# Patient Record
Sex: FEMALE | Race: WHITE | NOT HISPANIC OR LATINO | URBAN - NONMETROPOLITAN AREA
[De-identification: names, ages, dates, MRNs, and addresses within clinical notes are randomized per-mention and may not be internally consistent; named-entity substitution may affect disease eponyms.]

---

## 2020-11-13 ENCOUNTER — LAB OUTSIDE AN ENCOUNTER (OUTPATIENT)
Dept: URBAN - NONMETROPOLITAN AREA CLINIC 13 | Facility: CLINIC | Age: 20
End: 2020-11-13

## 2020-11-13 ENCOUNTER — TELEPHONE ENCOUNTER (OUTPATIENT)
Dept: URBAN - NONMETROPOLITAN AREA CLINIC 2 | Facility: CLINIC | Age: 20
End: 2020-11-13

## 2020-11-13 ENCOUNTER — OFFICE VISIT (OUTPATIENT)
Dept: URBAN - NONMETROPOLITAN AREA CLINIC 13 | Facility: CLINIC | Age: 20
End: 2020-11-13
Payer: COMMERCIAL

## 2020-11-13 VITALS
BODY MASS INDEX: 26.93 KG/M2 | HEIGHT: 63 IN | HEART RATE: 69 BPM | WEIGHT: 152 LBS | DIASTOLIC BLOOD PRESSURE: 81 MMHG | SYSTOLIC BLOOD PRESSURE: 146 MMHG | TEMPERATURE: 97.7 F

## 2020-11-13 DIAGNOSIS — R11.2 NAUSEA & VOMITING: ICD-10-CM

## 2020-11-13 PROCEDURE — G8420 CALC BMI NORM PARAMETERS: HCPCS | Performed by: NURSE PRACTITIONER

## 2020-11-13 PROCEDURE — 1036F TOBACCO NON-USER: CPT | Performed by: NURSE PRACTITIONER

## 2020-11-13 PROCEDURE — G8427 DOCREV CUR MEDS BY ELIG CLIN: HCPCS | Performed by: NURSE PRACTITIONER

## 2020-11-13 PROCEDURE — 99204 OFFICE O/P NEW MOD 45 MIN: CPT | Performed by: NURSE PRACTITIONER

## 2020-11-13 RX ORDER — AMITRIPTYLINE HYDROCHLORIDE 10 MG/1
1 TABLET AT BEDTIME TABLET, FILM COATED ORAL ONCE A DAY
Qty: 90 TABLET | Refills: 3
Start: 2020-11-13

## 2020-11-13 RX ORDER — AMITRIPTYLINE HYDROCHLORIDE 10 MG/1
1 TABLET AT BEDTIME TABLET, FILM COATED ORAL ONCE A DAY
Qty: 90 TABLET | Refills: 3 | OUTPATIENT
Start: 2020-11-13

## 2020-11-13 NOTE — HPI-TODAY'S VISIT:
ER follow-up for N/V Susan Alvarado is a 20-year-old female who presents for evaluation of intractable nausea and vomiting.  She states last week she woke up Thursday morning with severe nausea and vomiting.  She went to an urgent care and she also had some congestion so she was treated with antibiotics, her congestion improved but she has had persistent vomiting.  She wakes up in the morning sick and has postprandial severe symptoms severe.  She is only able to keep down soda crackers and juice at this point.  She went to the ER over the weekend and had lab work including CBC CMP that were normal along with a lipase.  She had an ultrasound of her gallbladder that was normal and a contrasted CT which shows no acute findings.  She does smoke marijuana recreationally, hot showers may or may not help her nausea.  She is never had episodes of nausea vomiting in the past but she does have a long history of reflux.  She does not take anything regularly for this.  She states that her bowels move fairly normally but she does have occasional diarrhea.  She has lost 5 pounds over the past week due to her poor intake.  She had to leave [place] from [facility] as she is a single mom and she could not be admitted as she did not have anyone to take care of her child.  Today she continues to struggle with her nausea and vomiting, she does still have her gallbladder and is concerned about this as well.  MB

## 2020-11-16 ENCOUNTER — TELEPHONE ENCOUNTER (OUTPATIENT)
Dept: URBAN - NONMETROPOLITAN AREA CLINIC 2 | Facility: CLINIC | Age: 20
End: 2020-11-16

## 2020-11-16 RX ORDER — AMITRIPTYLINE HYDROCHLORIDE 10 MG/1
1 TABLET AT BEDTIME TABLET, FILM COATED ORAL ONCE A DAY
Qty: 90 TABLET | Refills: 3 | Status: ACTIVE | COMMUNITY
Start: 2020-11-13

## 2020-11-16 RX ORDER — PANTOPRAZOLE SODIUM 40 MG/1
TAKE 1 TABLET (40 MG) BY ORAL ROUTE ONCE DAILY TABLET, DELAYED RELEASE ORAL ONCE A DAY
Qty: 90 TABLET | Refills: 3 | OUTPATIENT
Start: 2020-11-16

## 2020-11-18 ENCOUNTER — OFFICE VISIT (OUTPATIENT)
Dept: URBAN - NONMETROPOLITAN AREA SURGERY CENTER 1 | Facility: SURGERY CENTER | Age: 20
End: 2020-11-18
Payer: COMMERCIAL

## 2020-11-18 DIAGNOSIS — K29.60 ADENOPAPILLOMATOSIS GASTRICA: ICD-10-CM

## 2020-11-18 DIAGNOSIS — K22.8 COLUMNAR-LINED ESOPHAGUS: ICD-10-CM

## 2020-11-18 PROCEDURE — 43239 EGD BIOPSY SINGLE/MULTIPLE: CPT | Performed by: INTERNAL MEDICINE

## 2020-11-18 PROCEDURE — G8907 PT DOC NO EVENTS ON DISCHARG: HCPCS | Performed by: INTERNAL MEDICINE

## 2020-11-19 ENCOUNTER — TELEPHONE ENCOUNTER (OUTPATIENT)
Dept: URBAN - NONMETROPOLITAN AREA CLINIC 2 | Facility: CLINIC | Age: 20
End: 2020-11-19

## 2020-12-09 ENCOUNTER — TELEPHONE ENCOUNTER (OUTPATIENT)
Dept: URBAN - METROPOLITAN AREA CLINIC 92 | Facility: CLINIC | Age: 20
End: 2020-12-09

## 2020-12-15 ENCOUNTER — DASHBOARD ENCOUNTERS (OUTPATIENT)
Age: 20
End: 2020-12-15

## 2020-12-15 ENCOUNTER — OFFICE VISIT (OUTPATIENT)
Dept: URBAN - NONMETROPOLITAN AREA CLINIC 2 | Facility: CLINIC | Age: 20
End: 2020-12-15
Payer: COMMERCIAL

## 2020-12-15 VITALS
BODY MASS INDEX: 26.75 KG/M2 | DIASTOLIC BLOOD PRESSURE: 73 MMHG | SYSTOLIC BLOOD PRESSURE: 109 MMHG | WEIGHT: 151 LBS | TEMPERATURE: 97.9 F | HEART RATE: 79 BPM | HEIGHT: 63 IN

## 2020-12-15 DIAGNOSIS — R11.2 NAUSEA & VOMITING: ICD-10-CM

## 2020-12-15 PROBLEM — 16932000 NAUSEA AND VOMITING: Status: ACTIVE | Noted: 2020-11-13

## 2020-12-15 PROCEDURE — G8482 FLU IMMUNIZE ORDER/ADMIN: HCPCS | Performed by: NURSE PRACTITIONER

## 2020-12-15 PROCEDURE — 99213 OFFICE O/P EST LOW 20 MIN: CPT | Performed by: NURSE PRACTITIONER

## 2020-12-15 PROCEDURE — G8420 CALC BMI NORM PARAMETERS: HCPCS | Performed by: NURSE PRACTITIONER

## 2020-12-15 PROCEDURE — 1036F TOBACCO NON-USER: CPT | Performed by: NURSE PRACTITIONER

## 2020-12-15 PROCEDURE — G8427 DOCREV CUR MEDS BY ELIG CLIN: HCPCS | Performed by: NURSE PRACTITIONER

## 2020-12-15 RX ORDER — PROMETHAZINE HYDROCHLORIDE 25 MG/1
1 TABLET AS NEEDED TABLET ORAL
Qty: 28 TABLET | Refills: 0 | OUTPATIENT
Start: 2020-12-15 | End: 2020-12-29

## 2020-12-15 RX ORDER — PANTOPRAZOLE SODIUM 40 MG/1
TAKE 1 TABLET (40 MG) BY ORAL ROUTE ONCE DAILY TABLET, DELAYED RELEASE ORAL ONCE A DAY
Qty: 90 TABLET | Refills: 3 | Status: ACTIVE | COMMUNITY
Start: 2020-11-16

## 2020-12-15 RX ORDER — AMITRIPTYLINE HYDROCHLORIDE 10 MG/1
1 TABLET AT BEDTIME TABLET, FILM COATED ORAL ONCE A DAY
Qty: 90 TABLET | Refills: 3 | Status: ACTIVE | COMMUNITY
Start: 2020-11-13

## 2020-12-15 RX ORDER — ONDANSETRON 4 MG/1
1 TABLET ON THE TONGUE AND ALLOW TO DISSOLVE TABLET, ORALLY DISINTEGRATING ORAL
Qty: 60 | Refills: 2 | OUTPATIENT
Start: 2020-12-15

## 2020-12-15 NOTE — HPI-TODAY'S VISIT:
Susan Alvarado is a 20-year-old female who presents for evaluation of intractable nausea and vomiting.  She states last week she woke up Thursday morning with severe nausea and vomiting.  She went to an urgent care and she also had some congestion so she was treated with antibiotics, her congestion improved but she has had persistent vomiting.  She wakes up in the morning sick and has postprandial severe symptoms severe.  She is only able to keep down soda crackers and juice at this point.  She went to the ER over the weekend and had lab work including CBC CMP that were normal along with a lipase.  She had an ultrasound of her gallbladder that was normal and a contrasted CT which shows no acute findings.  She does smoke marijuana recreationally, hot showers may or may not help her nausea.  She is never had episodes of nausea vomiting in the past but she does have a long history of reflux.  She does not take anything regularly for this.  She states that her bowels move fairly normally but she does have occasional diarrhea.  She has lost 5 pounds over the past week due to her poor intake.  She had to leave [place] from [facility] as she is a single mom and she could not be admitted as she did not have anyone to take care of her child.  Today she continues to struggle with her nausea and vomiting, she does still have her gallbladder and is concerned about this as well.  MB   12/15/2020  Susan Alvarado presents for follow upo f N/V. Since her last visit her EGD reveals mild gastritis and reflux. Her hida shows delayed uptake of the CCK with concern for chronic cholecystitis otherwise EF in the 70's. She is taking the AMT 10mg QHS and this has helped her appetite and the am nausea but she still has post prandial nausea and pain. She has stopped smoking marajuana for the past month with no relief. Today she is ready to pursue CCY. MB

## 2020-12-17 ENCOUNTER — TELEPHONE ENCOUNTER (OUTPATIENT)
Dept: URBAN - NONMETROPOLITAN AREA CLINIC 2 | Facility: CLINIC | Age: 20
End: 2020-12-17

## 2020-12-17 RX ORDER — PROMETHAZINE HYDROCHLORIDE 25 MG/1
1 TABLET AS NEEDED TABLET ORAL
Qty: 28 TABLET | Refills: 0 | Status: ACTIVE | COMMUNITY
Start: 2020-12-15 | End: 2020-12-29

## 2020-12-17 RX ORDER — SUCRALFATE 1 G/1
1 PO BID BEFORE LUNCH AND BEDTIME NOT W/I 1 HOUR OF OTHER MEDS TABLET ORAL BID
Qty: 180 TABLET | Refills: 3 | OUTPATIENT
Start: 2020-12-17 | End: 2021-12-12

## 2020-12-17 RX ORDER — AMITRIPTYLINE HYDROCHLORIDE 10 MG/1
1 TABLET AT BEDTIME TABLET, FILM COATED ORAL ONCE A DAY
Qty: 90 TABLET | Refills: 3 | Status: ACTIVE | COMMUNITY
Start: 2020-11-13

## 2020-12-17 RX ORDER — PANTOPRAZOLE SODIUM 40 MG/1
TAKE 1 TABLET (40 MG) BY ORAL ROUTE ONCE DAILY TABLET, DELAYED RELEASE ORAL ONCE A DAY
Qty: 90 TABLET | Refills: 3 | Status: ACTIVE | COMMUNITY
Start: 2020-11-16

## 2020-12-17 RX ORDER — ONDANSETRON 4 MG/1
1 TABLET ON THE TONGUE AND ALLOW TO DISSOLVE TABLET, ORALLY DISINTEGRATING ORAL
Qty: 60 | Refills: 2 | Status: ACTIVE | COMMUNITY
Start: 2020-12-15

## 2020-12-17 RX ORDER — HYOSCYAMINE SULFATE 0.12 MG/1
1 TABLET UNDER THE TONGUE AND ALLOW TO DISSOLVE  AS NEEDED TABLET SUBLINGUAL
Qty: 60 TABLET | Refills: 6 | OUTPATIENT
Start: 2020-12-17 | End: 2021-07-15

## 2021-03-09 ENCOUNTER — OFFICE VISIT (OUTPATIENT)
Dept: URBAN - NONMETROPOLITAN AREA CLINIC 2 | Facility: CLINIC | Age: 21
End: 2021-03-09

## 2021-03-15 ENCOUNTER — OFFICE VISIT (OUTPATIENT)
Dept: URBAN - NONMETROPOLITAN AREA CLINIC 13 | Facility: CLINIC | Age: 21
End: 2021-03-15

## 2021-03-24 ENCOUNTER — OFFICE VISIT (OUTPATIENT)
Dept: URBAN - METROPOLITAN AREA TELEHEALTH 2 | Facility: TELEHEALTH | Age: 21
End: 2021-03-24

## 2021-03-24 RX ORDER — SUCRALFATE 1 G/1
1 PO BID BEFORE LUNCH AND BEDTIME NOT W/I 1 HOUR OF OTHER MEDS TABLET ORAL BID
Qty: 180 TABLET | Refills: 3 | Status: ACTIVE | COMMUNITY
Start: 2020-12-17 | End: 2021-12-12

## 2021-03-24 RX ORDER — AMITRIPTYLINE HYDROCHLORIDE 10 MG/1
1 TABLET AT BEDTIME TABLET, FILM COATED ORAL ONCE A DAY
Qty: 90 TABLET | Refills: 3 | Status: ACTIVE | COMMUNITY
Start: 2020-11-13

## 2021-03-24 RX ORDER — HYOSCYAMINE SULFATE 0.12 MG/1
1 TABLET UNDER THE TONGUE AND ALLOW TO DISSOLVE  AS NEEDED TABLET SUBLINGUAL
Qty: 60 TABLET | Refills: 6 | Status: ACTIVE | COMMUNITY
Start: 2020-12-17 | End: 2021-07-15

## 2021-03-24 RX ORDER — PANTOPRAZOLE SODIUM 40 MG/1
TAKE 1 TABLET (40 MG) BY ORAL ROUTE ONCE DAILY TABLET, DELAYED RELEASE ORAL ONCE A DAY
Qty: 90 TABLET | Refills: 3 | Status: ACTIVE | COMMUNITY
Start: 2020-11-16

## 2021-03-24 RX ORDER — ONDANSETRON 4 MG/1
1 TABLET ON THE TONGUE AND ALLOW TO DISSOLVE TABLET, ORALLY DISINTEGRATING ORAL
Qty: 60 | Refills: 2 | Status: ACTIVE | COMMUNITY
Start: 2020-12-15

## 2021-03-24 NOTE — HPI-TODAY'S VISIT:
Susan Alvarado is a 20-year-old female who presents for evaluation of intractable nausea and vomiting.  She states last week she woke up Thursday morning with severe nausea and vomiting.  She went to an urgent care and she also had some congestion so she was treated with antibiotics, her congestion improved but she has had persistent vomiting.  She wakes up in the morning sick and has postprandial severe symptoms severe.  She is only able to keep down soda crackers and juice at this point.  She went to the ER over the weekend and had lab work including CBC CMP that were normal along with a lipase.  She had an ultrasound of her gallbladder that was normal and a contrasted CT which shows no acute findings.  She does smoke marijuana recreationally, hot showers may or may not help her nausea.  She is never had episodes of nausea vomiting in the past but she does have a long history of reflux.  She does not take anything regularly for this.  She states that her bowels move fairly normally but she does have occasional diarrhea.  She has lost 5 pounds over the past week due to her poor intake.  She had to leave [place] from [facility] as she is a single mom and she could not be admitted as she did not have anyone to take care of her child.  Today she continues to struggle with her nausea and vomiting, she does still have her gallbladder and is concerned about this as well.  MB   12/15/2020  Susan Alvarado presents for follow upo f N/V. Since her last visit her EGD reveals mild gastritis and reflux. Her hida shows delayed uptake of the CCK with concern for chronic cholecystitis otherwise EF in the 70's. She is taking the AMT 10mg QHS and this has helped her appetite and the am nausea but she still has post prandial nausea and pain. She has stopped smoking marajuana for the past month with no relief. Today she is ready to pursue CCY. MB